# Patient Record
Sex: MALE | URBAN - METROPOLITAN AREA
[De-identification: names, ages, dates, MRNs, and addresses within clinical notes are randomized per-mention and may not be internally consistent; named-entity substitution may affect disease eponyms.]

---
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
## 2025-03-28 NOTE — ED ADULT TRIAGE NOTE - CADM TRG TX PRIOR TO ARRIVAL
Increased iron binding capacity but normal iron stores. BMP wnl. PT/INR normal. CBC normal.     Pool, please let the patient/parent know the following:     Your labs are within normal limits.  You are not anemic.     none

## 2025-06-27 NOTE — PSYCHIATRIC REHAB INITIAL EVALUATION - ABILITY TO HEAR (WITH HEARING AID OR HEARING APPLIANCE IF NORMALLY USED):
54 year old looking for rehab, awaiting a bed search.Not wanting to go to psych. Otherwise stable  .     Sea Byrnes, DO  06/27/25 1809     Adequate: hears normal conversation without difficulty
